# Patient Record
(demographics unavailable — no encounter records)

---

## 2022-07-13 LAB
ANION GAP SERPL CALC-SCNC: 11 MMOL/L (ref 2–17)
APPEARANCE UR: CLEAR
APPEARANCE: CLEAR
BACTERIA, URINE: ABNORMAL
BILIRUB UR STRIP.AUTO-MCNC: NEGATIVE MG/DL
BILIRUBIN, URINE, POC: NEGATIVE
BLOOD URINE, POC: ABNORMAL
BUN SERPL-MCNC: 24 MG/DL (ref 8–23)
CALCIUM SERPL-MCNC: 9.4 MG/DL (ref 8.8–10.2)
CHLORIDE SERPL-SCNC: 102 MMOL/L (ref 98–107)
COLOR UR: ABNORMAL
CREAT SERPL-MCNC: 0.9 MG/DL (ref 0.5–1)
DEPRECATED HCO3 PLAS-SCNC: 24 MMOL/L (ref 22–29)
ERYTHROCYTE [DISTWIDTH] IN BLOOD BY AUTOMATED COUNT: 17.3 % (ref 11–16)
GFR SERPL CREATININE-BSD FRML MDRD: 59 ML/MIN/1.73M²
GLUCOSE SERPL-MCNC: 111 MG/DL (ref 70–99)
GLUCOSE UR STRIP.AUTO-MCNC: NEGATIVE MG/DL
GLUCOSE URINE, POC: NEGATIVE MG/DL
HCT VFR BLD AUTO: 33.9 % (ref 34–47)
HGB BLD-MCNC: 10.6 G/DL (ref 11.5–15.7)
KETONES UR STRIP.AUTO-MCNC: NEGATIVE MG/DL
KETONES, URINE, POC: NEGATIVE MG/DL
LEUKOCYTE EST, POC: NEGATIVE
LEUKOCYTE ESTERASE UR QL STRIP: ABNORMAL
MCH RBC QN AUTO: 25.7 PG (ref 27–34.5)
MCHC RBC AUTO-ENTMCNC: 31.3 G/DL (ref 32–36)
MCV RBC AUTO: 82.3 FL (ref 81–99)
MORPHOLOGY BLD-IMP: NORMAL
MUCUS, URINE: ABNORMAL /LPF
NITRATE, URINE POC: POSITIVE
NITRITE UR QL STRIP.AUTO: POSITIVE
OSMOLALITY SERPL CALC.SUM OF ELEC: 278 MOSM/KG (ref 270–287)
PH UR STRIP.AUTO: 6 [PH] (ref 4.5–8)
PH, URINE, POC: 5.5 (ref 4.5–8)
PLATELET # BLD AUTO: 199 X10E3/MCL (ref 140–440)
PLATELET BLD QL SMEAR: ADEQUATE
PMV BLD AUTO: 10 FL (ref 7.2–13.2)
POTASSIUM SERPL-SCNC: 4.4 MMOL/L (ref 3.5–5.3)
PROT UR QL STRIP: NEGATIVE
PROTEIN,URINE, POC: NEGATIVE
RBC # BLD AUTO: 4.12 X10E6/MCL (ref 3.6–5.2)
RBC # UR STRIP: NEGATIVE /UL
RBC URINE: ABNORMAL /HPF (ref 0–2)
SODIUM SERPL-SCNC: 137 MMOL/L (ref 135–145)
SP GR UR STRIP: 1.02 (ref 1–1.03)
SPECIFIC GRAVITY, URINE, POC: 1.02 (ref 1–1.03)
SQUAMOUS EPITHELIAL: ABNORMAL /LPF
URINALYSIS COLOR, POC: YELLOW
UROBILIN U POC: 0.2 EU/DL
UROBILINOGEN UR STRIP-MCNC: 0.2 EU/DL
WBC # BLD AUTO: 5.9 X10E3/MCL (ref 3.8–10.6)
WBC URINE: ABNORMAL /HPF (ref 0–2)

## 2022-07-14 LAB
FINAL REPORT: NORMAL
ORGANISM: NORMAL
PRELIMINARY: NORMAL

## 2022-10-19 NOTE — ED NOTES
ED Pre-Arrival Note        Pre-Arrival Summary    Name:  NILS/MARICRUZ 8,    Current Date:  7/13/2022 11:07:47 EDT  Gender:    Date of Birth:    Age:  80  Pre-Arrival Type:  EMS  ETA:  7/13/2022 11:19:00 EDT  Primary Care Physician:    Presenting Problem:  malaise  Pre-Arrival User:  Salomón Rodriguez, RN, Talia Frederick  Referring Source:    Location:  [facility]  BP:  120/52  HR:  50  O2:  80            PreArrival Communication Form  Emergency Department        Additional Patient Information: feels tired after work        Orders:  [    ] CBC                                            [     ] CT Head no contrast  [    ] BMP                                           [     ] CT Abdomen/Pelvis no contrast  [    ] PT/INR                                       [     ] CT Abdomen/Pelvis IV contrast, w/ oral contrast  [    ] Troponin                                   [     ] CT Abdomen/Pelvis IV contrast, no oral contrast  [    ] BNP                                            [     ] See ordersheet  [    ] CXR                                             [     ] Other:__________________________  [    ] EKG

## 2022-10-19 NOTE — ED NOTES
amounts of urine frequently. Pain or burning with urination. Blood in the urine. Urine that smells bad or unusual.     Trouble urinating. Cloudy urine. Vaginal discharge, if you are female. Pain in the abdomen or the lower back. You may also have:   Vomiting or a decreased appetite. Confusion. Irritability or tiredness. A fever. Diarrhea. The first symptom in older adults may be confusion. In some cases, they may not have any symptoms until the infection has worsened. How is this diagnosed? This condition is diagnosed based on your medical history and a physical exam. You may also have other tests, including:   Urine tests. Blood tests. Tests for sexually transmitted infections (STIs). If you have had more than one UTI, a cystoscopy or imaging studies may be done to determine the cause of the infections. How is this treated? Treatment for this condition includes:   Antibiotic medicine. Over-the-counter medicines to treat discomfort. Drinking enough water to stay hydrated. If you have frequent infections or have other conditions such as a kidney stone, you may need to see a health care provider who specializes in the urinary tract (urologist). In rare cases, urinary tract infections can cause sepsis. Sepsis is a life-threatening condition that occurs when the body responds to an infection. Sepsis is treated in the hospital with IV antibiotics, fluids, and other medicines. Follow these instructions at home:      Medicines     Take over-the-counter and prescription medicines only as told by your health care provider. If you were prescribed an antibiotic medicine, take it as told by your health care provider. Do not stop using the antibiotic even if you start to feel better. General instructions     Make sure you:  ? Empty your bladder often and completely. Do not hold urine for long periods of time. ?  Empty your bladder after sex. ? Wipe from front to back after a bowel movement if you are female. Use each tissue one time when you wipe. Drink enough fluid to keep your urine pale yellow. Keep all follow-up visits as told by your health care provider. This is important. Contact a health care provider if:     Your symptoms do not get better after 1?2 days. Your symptoms go away and then return. Get help right away if you have:     Severe pain in your back or your lower abdomen. A fever. Nausea or vomiting. Summary     A urinary tract infection (UTI) is an infection of any part of the urinary tract, which includes the kidneys, ureters, bladder, and urethra. Most urinary tract infections are caused by bacteria in your genital area, around the entrance to your urinary tract (urethra). Treatment for this condition often includes antibiotic medicines. If you were prescribed an antibiotic medicine, take it as told by your health care provider. Do not stop using the antibiotic even if you start to feel better. Keep all follow-up visits as told by your health care provider. This is important. This information is not intended to replace advice given to you by your health care provider. Make sure you discuss any questions you have with your health care provider. Document Revised: 12/05/2019 Document Reviewed: 06/27/2019  Elsevier Patient Education ?  [address] [place].

## 2022-10-19 NOTE — ED NOTES
ED Triage Note       ED Triage Adult Entered On:  7/13/2022 11:20 EDT    Performed On:  7/13/2022 11:12 EDT by Poncho Alexander RN, Felicia               Triage   Numeric Rating Pain Scale :   0 = No pain   Chief Complaint :   Presents per EMS from SNF. Pt has no c/o's, per Nsg staff \"her blood pressure was trending down\". Pt is A+Ox3, resps easy, skin W+D. Afib per monitor, on Xarelto. Denies feeling lightheaded or dizzy.  HR 46-52 bpm.    [facility] Mode of Arrival :   Ambulance   Infectious Disease Documentation :   Document assessment   Patient received chemo or biotherapy last 48 hrs? :   No   Temperature Oral :   36.5 degC(Converted to: 97.7 degF)    Heart Rate Monitored :   46 bpm (<LLOW)    Respiratory Rate :   16 br/min   Systolic Blood Pressure :   141 mmHg (HI)    Diastolic Blood Pressure :   59 mmHg (LOW)    SpO2 :   98 %   Oxygen Therapy :   Room air   Patient presentation :   None of the above   Chief Complaint or Presentation suggest infection :   No   Weight Dosing :   68.4 kg(Converted to: 150 lb 13 oz)    Height :   151 cm(Converted to: 4 ft 11 in)    Body Mass Index Dosing :   30 kg/m2   Erin Pascal - 7/13/2022 11:12 EDT   DCP GENERIC CODE   Tracking Acuity :   2   Tracking Group :   ED [facility] [address], [place] - 7/13/2022 11:12 EDT   ED General Section :   Document assessment   Pregnancy Status :   N/A   ED Allergies Section :   Document assessment   ED Reason for Visit Section :   Document assessment   Erin Pascal - 7/13/2022 11:12 EDT   PTA/Triage Treatments   ED PTA Pre-Arrival Service :   [facility]   ED PTA Medic Number :   [address], [place] - 7/13/2022 11:12 EDT   ID Risk Screen Symptoms   Recent Travel History :   No recent travel   Last 90 days COVID-19 ID :   No   Close Contact with COVID-19 ID :   No   Last 14 days COVID-19 ID :   No   Max Rom Alberto Michelle - 7/13/2022 11:12 EDT   Allergies   (As Of: 7/13/2022 11:20:23 EDT)   Allergies (Active) Yes normal...

## 2022-10-19 NOTE — ED NOTES
ED Triage Note       ED Secondary Triage Entered On:  7/13/2022 11:50 EDT    Performed On:  7/13/2022 11:46 EDT by Sabrina Mcadams RN, Vika Vasquez Information   Barriers to Learning :   None evident   ED Home Meds Section :   Document assessment   [facility] ED Fall Risk Section :   Document assessment   ED History Section :   Document assessment   ED Advance Directives Section :   Document assessment   ED Palliative Screen :   Document assessment   Erin Keyramírez - 7/13/2022 11:46 EDT   (As Of: 7/13/2022 11:50:07 EDT)   Problems(Active)    Atrial fibrillation (SNOMED CT  :96542172 )  Name of Problem:   Atrial fibrillation ; Recorder:   Daisy Brady; Confirmation:   Confirmed ; Classification:   Patient Stated ; Code:   [id number] ; Contributor System:   Ge[employer] ; Last Updated:   7/13/2022 11:20 EDT ; Life Cycle Date:   7/13/2022 ; Life Cycle Status:   Active ; Vocabulary:   SNOMED CT        Hypertension (SNOMED CT  :9937861738 )  Name of Problem:   Hypertension ; Recorder:   Daisy Brady; Confirmation:   Confirmed ; Classification:   Patient Stated ; Code:   [id number] ; Contributor System:   PowerChart ; Last Updated:   7/13/2022 11:20 EDT ; Life Cycle Date:   7/13/2022 ; Life Cycle Status:   Active ; Vocabulary:   SNOMED CT          Diagnoses(Active)    Medical screening exam  Date:   7/13/2022 ; Diagnosis Type:   Reason For Visit ; Confirmation:   Complaint of ; Clinical Dx:    Medical screening exam ; Classification:   Medical ; Clinical Service:   Non-Specified ; Code:   PNED ; Probability:   0 ; Diagnosis Code:   [id number]             -    Procedure History   (As Of: 7/13/2022 11:50:07 EDT)     [facility] Fall Risk Assessment Tool   Hx of falling last 3 months ED Fall :   No   Erintiffanie Brady - 7/13/2022 11:46 EDT   ED Advance Directive   Advance Directive :   No   Kimberly Becker RN, Daisy Hines - 7/13/2022 11:46 EDT   Palliative Care   Does the Patient have a Life Limiting Illness :   None of the above   Yanilie Plaster - 7/13/2022 11:46 EDT   Social History   Social History   (As Of: 7/13/2022 11:50:07 EDT)   Tobacco:        Tobacco use: Never (less than 100 in lifetime). (Last Updated: 9/22/2021 13:30:03 EDT by Eric Allen RN, Attila Deluna)          Electronic Cigarette/Vaping:        Never Electronic Cigarette Use. (Last Updated: 9/22/2021 13:30:07 EDT by Eric Allen RN, Attila Deluna)          Alcohol:        Denies   (Last Updated: 9/22/2021 13:30:11 EDT by Eric Allen RN, Attila Deluna)          Substance Use:        Denies   (Last Updated: 9/22/2021 13:30:14 EDT by Eric Allen RN, Attila Deluna)            Med Hx   Medication List   (As Of: 7/13/2022 11:50:07 EDT)   Normal Order    Sodium Chloride 0.9% intravenous solution Bolus  :   Sodium Chloride 0.9% intravenous solution Bolus ; Status:   Ordered ; Ordered As Mnemonic:   Sodium Chloride 0.9% bolus ; Simple Display Line:   500 mL, 2000 mL/hr, IV Piggyback, Once ; Ordering Provider:   Hilton MAY; Catalog Code:   Sodium Chloride 0.9% ; Order Dt/Tm:   7/13/2022 11:22:45 EDT            Home Meds    amLODIPine  :   amLODIPine ; Status:   Documented ; Ordered As Mnemonic:   amLODIPine 5 mg oral tablet ; Simple Display Line:   5 mg, 1 tabs, Oral, Daily, 0 Refill(s) ; Catalog Code:   amLODIPine ; Order Dt/Tm:   7/13/2022 11:49:13 EDT          esomeprazole  :   esomeprazole ; Status:   Documented ; Ordered As Mnemonic:   esomeprazole 40 mg oral delayed release capsule ; Simple Display Line:   40 mg, 1 caps, Oral, Daily, 0 Refill(s) ; Catalog Code:   esomeprazole ; Order Dt/Tm:   7/13/2022 11:49:13 EDT          linaclotide  :   linaclotide ; Status:   Documented ; Ordered As Mnemonic:   Linzess 290 mcg oral capsule ; Simple Display Line:   0 Refill(s) ;  Catalog Code:   linaclotide ; Order Dt/Tm:   7/13/2022 11:49:13 EDT          amitriptyline  :   amitriptyline ; Status:   Documented ; Ordered As Mnemonic:   amitriptyline 25 mg oral tablet ; Simple Display Line:   See Instructions, take 35mg daily for depression, 0 Refill(s) ; Catalog Code:   amitriptyline ; Order Dt/Tm:   7/13/2022 11:49:13 EDT          conjugated estrogens  :   conjugated estrogens ; Status:   Documented ; Ordered As Mnemonic:   Premarin 0.3 mg oral tablet ; Simple Display Line:   0.3 mg, 1 tabs, Oral, Daily, 30 tabs, 0 Refill(s) ; Catalog Code:   conjugated estrogens ; Order Dt/Tm:   7/13/2022 11:49:13 EDT          hydrochlorothiazide  :   hydrochlorothiazide ; Status:   Documented ; Ordered As Mnemonic:   hydrochlorothiazide 25 mg oral tablet ; Simple Display Line:   25 mg, 1 tabs, Oral, Daily, 0 Refill(s) ; Catalog Code:   hydrochlorothiazide ; Order Dt/Tm:   7/13/2022 11:49:13 EDT          irbesartan  :   irbesartan ; Status:   Documented ; Ordered As Mnemonic:   irbesartan 300 mg oral tablet ; Simple Display Line:   300 mg, 1 tabs, Oral, Daily, 30 tabs, 0 Refill(s) ; Catalog Code:   irbesartan ; Order Dt/Tm:   7/13/2022 11:49:13 EDT          metoprolol  :   metoprolol ; Status:   Documented ; Ordered As Mnemonic:   Metoprolol Succinate ER 50 mg oral tablet, extended release ; Simple Display Line:   50 mg, 1 tabs, Oral, Daily, 30 tabs, 0 Refill(s) ; Catalog Code:   metoprolol ; Order Dt/Tm:   7/13/2022 11:49:13 EDT          potassium chloride  :   potassium chloride ; Status:   Documented ; Ordered As Mnemonic:   Potassium Chloride (Eqv-Klor-Con M10) 10 mEq oral tablet, extended release ; Simple Display Line:   0 Refill(s) ;  Catalog Code:   potassium chloride ; Order Dt/Tm:   7/13/2022 11:49:13 EDT

## 2022-10-19 NOTE — ED PROVIDER NOTES
Weakness or Fatigue *ED        Patient:   Kimberlyn Santos            MRN: [id number]            FIN: [id number]               Age:   80 years     Sex:  Female     :  1927   Associated Diagnoses:   Urinary tract infection; Bradycardia   Author:   Margarita MAY      Basic Information   Time seen: Provider Seen (ST)   ED Provider/Time:    Margarita MAY / 2022 11:12  . Additional information: Chief Complaint from Nursing Triage Note   Chief Complaint  Chief Complaint: Presents per EMS from SNF. Pt has no c/o's, per Nsg staff \"her blood pressure was trending down\". Pt is A+Ox3, resps easy, skin W+D. Afib per monitor, on Xarelto. Denies feeling lightheaded or dizzy. HR 46-52 bpm. (22 11:12:00). History of Present Illness   Presents from her skilled nursing facility for evaluation of weakness. She denies fevers, chest pain, shortness of breath, blood in stool, abdominal pain, vomiting, or fevers. She has not had any clear urinary symptoms. But her blood pressure was getting a little low but the patient notes that she took her antihypertensives this morning about 7 AM.  No specific aggravating or alleviating factors. She is on Xarelto. Severity is mild. Review of Systems             Additional review of systems information: All other systems reviewed and otherwise negative. Health Status   Allergies: Allergic Reactions (All)  No Known Allergies. Past Medical/ Family/ Social History   Medical history: Reviewed as documented in chart. Surgical history: Reviewed as documented in chart. Family history: Not significant. Social history: Reviewed as documented in chart. Problem list:    Active Problems (2)  Atrial fibrillation   Hypertension   .       Physical Examination               Vital Signs   Vital Signs   6907 73:05 EDT Systolic Blood Pressure 857 mmHg    Diastolic Blood Pressure 60 mmHg    Heart Rate Monitored 50 bpm  LOW    Respiratory Rate 16 br/min Mean Arterial Pressure, Cuff 76 mmHg    SpO2 98 %   3/60/5219 78:24 EDT Systolic Blood Pressure 926 mmHg    Diastolic Blood Pressure 59 mmHg  LOW    Heart Rate Monitored 43 bpm  <LLOW    Respiratory Rate 19 br/min    Mean Arterial Pressure, Cuff 91 mmHg    SpO2 99 %   6/64/4060 25:90 EDT Systolic Blood Pressure 985 mmHg    Diastolic Blood Pressure 59 mmHg  LOW    Peripheral Pulse Rate 45 bpm  LOW    Heart Rate Monitored 44 bpm  <LLOW    Respiratory Rate 17 br/min    Mean Arterial Pressure, Cuff 92 mmHg    SpO2 99 %   4/12/6193 43:27 EDT Systolic Blood Pressure 914 mmHg  HI    Diastolic Blood Pressure 59 mmHg  LOW    Temperature Oral 36.5 degC    Heart Rate Monitored 46 bpm  <LLOW    Respiratory Rate 16 br/min    SpO2 98 %   . Measurements   7/13/2022 11:20 EDT Body Mass Index est lb 30.00 kg/m2    Body Mass Index Measured 30.00 kg/m2   7/13/2022 11:12 EDT Height/Length Measured 151 cm    Weight Dosing 68.4 kg   . Basic Oxygen Information   7/13/2022 12:49 EDT Oxygen Therapy Room air    SpO2 98 %   7/13/2022 12:00 EDT Oxygen Therapy Room air    SpO2 99 %   7/13/2022 11:51 EDT Oxygen Therapy Room air    SpO2 99 %   7/13/2022 11:12 EDT Oxygen Therapy Room air    SpO2 98 %   . General:  Alert, no acute distress. Skin:  Warm, dry, pink. Head:  Normocephalic, atraumatic. Neck:  Supple. Eye:  Extraocular movements are intact, normal conjunctiva. Ears, nose, mouth and throat:  Slightly dry mucous membranes. Cardiovascular:  Regular rate and rhythm, No murmur, Normal peripheral perfusion. Respiratory:  Lungs are clear to auscultation, respirations are non-labored, breath sounds are equal.    Gastrointestinal:  Soft, Non distended, Normal bowel sounds, Mild suprapubic tenderness. No rebound or guarding. Musculoskeletal:  No tenderness, no deformity. Neurological:  Alert and oriented to person, place, time, and situation, No focal neurological deficit observed.     Psychiatric:  Cooperative, Very pleasant. Medical Decision Making   Documents reviewed:  Emergency department nurses' notes, flowsheet, vital signs. Electrocardiogram:  Emergency Provider interpretation performed by me, EKG by my review interpretation shows a supraventricular bradycardia and there is left axis deviation. Nonspecific T wave changes. No ST elevation. .    Results review:  Lab results : Lab View   7/13/2022 11:46 EDT Estimated Creatinine Clearance 31.82 mL/min   7/13/2022 11:26 EDT WBC 5.9 x10e3/mcL    RBC 4.12 x10e6/mcL    Hgb 10.6 g/dL  LOW    HCT 33.9 %  LOW    MCV 82.3 fL    MCH 25.7 pg  LOW    MCHC 31.3 g/dL  LOW    RDW 17.3 %  HI    Platelet 792 [id number]    MPV 10.0 fL    PLT estimate Adequate    RBC morphology Not Indicated    UA Color Straw    UA Appear Clear    UA Glucose Negative    UA Bili Negative    UA Ketones Negative    UA Spec Grav 1.020    UA Blood Negative    UA pH 6.0    Protein U Negative    UA Urobilinogen 0.2 EU/dL    UA Nitrite Positive    UA Leuk Est Small    WBC U 6-10 /HPF    RBC U 0-2 /HPF    Sq Epi U Few /LPF    Bacteria U Moderate    Mucus U Few /LPF    Sodium Lvl 137 mmol/L    Potassium Lvl 4.4 mmol/L    Chloride 102 mmol/L    CO2 24 mmol/L    Glucose Random 111 mg/dL  HI    BUN 24 mg/dL  HI    Creatinine Lvl 0.9 mg/dL    AGAP 11 mmol/L    Osmolality Calc 278 mOsm/kg    Calcium Lvl 9.4 mg/dL    eGFR 59 mL/min/1.73mÂ²  LOW   7/13/2022 11:18 EDT Appear U POC Clear    Color U POC Yellow    Bili U POC Negative    Blood U POC Trace    Glucose U POC Negative mg/dL    Ketones U POC Negative mg/dL    Leuk Est U POC Negative    Nitrite U POC Positive    pH U POC 5.5    Protein U POC Negative    Spec Grav U POC 1.025    Urobilin U POC 0.2 EU/dL   . Radiology results:  Rad Results (ST)   No qualifying data available. .      Impression and Plan   Diagnosis   Urinary tract infection (TEF76-PV N39.0, Discharge, Medical)   Bradycardia (CRF82-WP R00.1, Discharge, Medical)   Plan   Condition: Improved. Disposition: Discharged: to home. Prescriptions: Launch prescriptions   Pharmacy:  Keflex 500 mg oral capsule (Prescribe): 500 mg, 1 caps, Oral, q6hr, for 7 days, 28 caps, 0 Refill(s). Patient was given the following educational materials: Urinary Tract Infection, Adult, Urinary Tract Infection, Adult. Follow up with: Follow up with primary care provider Within 2 to 4 days For reevaluation if symptoms not improving; return to ED if any new concerns or changes in symptoms    Your heart rate was low today. Decrease the metoprolol dose in half from 50 mg to 25 mg. If the heart rate is still below 60 then stop taking the medicine. Please follow-up with your cardiologist or primary care doctor to reevaluate this. Notes: Patient presents with generalized weakness. She was bradycardic here but she has slightly hypertensive blood pressure. She had some lower abdominal tenderness with urine appears infected. We will treat her for UTI. Advised to decrease the metoprolol and follow-up with her doctor. I do not think she requires intervention regarding the heart rate as she is clearly perfusing well and has a normal blood pressure, really that is slightly hypertensive.     Signature Line     Electronically Signed on 07/13/2022 02:10 PM EDT   ________________________________________________   Meka MAY

## 2022-10-19 NOTE — ED NOTES
Dose:____________________  potassium chloride (Potassium Chloride (Eqv-Klor-Con M10) 10 mEq oral tablet, extended release)   Last Dose:____________________      Allergy Info: No Known Allergies     Discharge Additional Information          Discharge Patient 07/13/22 14:04:00 EDT      Patient Education Materials:        Urinary Tract Infection, Adult      A urinary tract infection (UTI) is an infection of any part of the urinary tract. The urinary tract includes the kidneys, ureters, bladder, and urethra. These organs make, store, and get rid of urine in the body. Your health care provider may use other names to describe the infection. An upper UTI affects the ureters and kidneys (pyelonephritis). A lower UTI affects the bladder (cystitis) and urethra (urethritis). What are the causes? Most urinary tract infections are caused by bacteria in your genital area, around the entrance to your urinary tract (urethra). These bacteria grow and cause inflammation of your urinary tract. What increases the risk? You are more likely to develop this condition if:   You have a urinary catheter that stays in place (indwelling). You are not able to control when you urinate or have a bowel movement (you have incontinence). You are female and you:  ? Use a spermicide or diaphragm for birth control. ? Have low estrogen levels. ? Are pregnant. You have certain genes that increase your risk (genetics). You are sexually active. You take antibiotic medicines. You have a condition that causes your flow of urine to slow down, such as:  ? An enlarged prostate, if you are male. ? Blockage in your urethra (stricture). ? A kidney stone. ? A nerve condition that affects your bladder control (neurogenic bladder). ? Not getting enough to drink, or not urinating often. You have certain medical conditions, such as:  ? Diabetes. ? A weak disease-fighting system (immunesystem). ? Sickle cell disease. ? Gout. ? Spinal cord injury. What are the signs or symptoms? Symptoms of this condition include:   Needing to urinate right away (urgently). Frequent urination or passing small amounts of urine frequently. Pain or burning with urination. Blood in the urine. Urine that smells bad or unusual.     Trouble urinating. Cloudy urine. Vaginal discharge, if you are female. Pain in the abdomen or the lower back. You may also have:   Vomiting or a decreased appetite. Confusion. Irritability or tiredness. A fever. Diarrhea. The first symptom in older adults may be confusion. In some cases, they may not have any symptoms until the infection has worsened. How is this diagnosed? This condition is diagnosed based on your medical history and a physical exam. You may also have other tests, including:   Urine tests. Blood tests. Tests for sexually transmitted infections (STIs). If you have had more than one UTI, a cystoscopy or imaging studies may be done to determine the cause of the infections. How is this treated? Treatment for this condition includes:   Antibiotic medicine. Over-the-counter medicines to treat discomfort. Drinking enough water to stay hydrated. If you have frequent infections or have other conditions such as a kidney stone, you may need to see a health care provider who specializes in the urinary tract (urologist). In rare cases, urinary tract infections can cause sepsis. Sepsis is a life-threatening condition that occurs when the body responds to an infection. Sepsis is treated in the hospital with IV antibiotics, fluids, and other medicines. Follow these instructions at home:      Medicines     Take over-the-counter and prescription medicines only as told by your health care provider.      If you were prescribed an antibiotic medicine, take it as told by your health care provider. Do not stop using the antibiotic even if you start to feel better. General instructions     Make sure you:  ? Empty your bladder often and completely. Do not hold urine for long periods of time. ? Empty your bladder after sex. ? Wipe from front to back after a bowel movement if you are female. Use each tissue one time when you wipe. Drink enough fluid to keep your urine pale yellow. Keep all follow-up visits as told by your health care provider. This is important. Contact a health care provider if:     Your symptoms do not get better after 1?2 days. Your symptoms go away and then return. Get help right away if you have:     Severe pain in your back or your lower abdomen. A fever. Nausea or vomiting. Summary     A urinary tract infection (UTI) is an infection of any part of the urinary tract, which includes the kidneys, ureters, bladder, and urethra. Most urinary tract infections are caused by bacteria in your genital area, around the entrance to your urinary tract (urethra). Treatment for this condition often includes antibiotic medicines. If you were prescribed an antibiotic medicine, take it as told by your health care provider. Do not stop using the antibiotic even if you start to feel better. Keep all follow-up visits as told by your health care provider. This is important. This information is not intended to replace advice given to you by your health care provider. Make sure you discuss any questions you have with your health care provider. Document Revised: 12/05/2019 Document Reviewed: 06/27/2019  [employer]it Patient Education ?  [address] [address]      ---------------------------------------------------------------------------------------------------------------------  [facility] allows patients to review your COVID and other test results as well as discharge documents from any [facility], Emergency Department, surgical center or outpatient lab. Test results are typically available 36 hours after the test is completed. [address] encourages you to self-enroll in the [facility] Patient Portal.     To begin your self-enrollment process, please visit [email or web address] Under [facility], click on Sign up now. NOTE: You must be 16 years and older to use [facility] Self-Enroll online. If you are a parent, caregiver, or guardian; you need an invite to access your childs or dependents health records. To obtain an invite, contact the Medical Records department at [phone] Monday through Friday, 8-4:30, select option 3 . If we receive your call afterhours, we will return your call the next business day. If you have issues trying to create or access your account, contact [employer] at [phone] available 7 days a week 24 hours a day.      Comment:

## 2022-10-19 NOTE — DISCHARGE SUMMARY
ED Clinical Summary                         [facility]  [address], [place], [id number] [phone]           PERSON INFORMATION  Name: Gely Arreguin Age:  80 Years : 1927   Sex: Female Language: English PCP: Kwaku MEIER   Marital Status:   Phone: [phone] Med Service: MED-Medicine   MRN:  [id number] Acct# [de-identified] Arrival: 2022 11:05:00   Visit Reason: Medical screening exam; EMS / MALAISE Acuity: 2 LOS: 000 04:57   Address:      [address] [place] [address]  Diagnosis:      Bradycardia; Urinary tract infection  Printed Prescriptions: Allergies      No Known Allergies      Medications Administered During Visit:                  Medication Dose Route   Sodium Chloride 0.9% 500 mL IV Piggyback   ceftriaxone 1 g IV Piggyback       Patient Medication List:              amitriptyline (amitriptyline 25 mg oral tablet) take 35mg daily for depression. amLODIPine (amLODIPine 5 mg oral tablet) 1 Tabs Oral (given by mouth) every day. cephalexin (Keflex 500 mg oral capsule) 1 Capsules Oral (given by mouth) every 6 hours for 7 Days. Refills: 0.  conjugated estrogens (Premarin 0.3 mg oral tablet) 1 Tabs Oral (given by mouth) every day.  esomeprazole (esomeprazole 40 mg oral delayed release capsule) 1 Capsules Oral (given by mouth) every day.  hydrochlorothiazide (hydrochlorothiazide 25 mg oral tablet) 1 Tabs Oral (given by mouth) every day. irbesartan (irbesartan 300 mg oral tablet) 1 Tabs Oral (given by mouth) every day. linaclotide (Linzess 290 mcg oral capsule)  metoprolol (Metoprolol Succinate ER 50 mg oral tablet, extended release) 1 Tabs Oral (given by mouth) every day. potassium chloride (Potassium Chloride (Eqv-Klor-Con M10) 10 mEq oral tablet, extended release)         Major Tests and Procedures: The following procedures and tests were performed during your ED visit.   COMMONPROCEDURES%>  COMMON PROCEDURESCOMMENTS%>          Laboratory Orders  Name Status Details   . UA Micro Completed Urine, Clean Catch, Stat, ST - Stat, Collected, 07/13/22 11:26:00 EDT, 07/13/22 11:26:00 EDT, Nurse collect, 07/13/22 11:26:00 EDT, Yovani MAY, Print label Y/N, [id number]   . UA POC Completed Urine, RT, RT - Routine, Collected, 07/13/22 11:18:00 EDT, Nurse collect, 07/13/22 11:18:00 /[facility], [facility] POC Login   BMP Completed Blood, Stat, ST - Stat, 07/13/22 11:22:00 EDT, 07/13/22 11:23:00 EDT, Nurse rubio, Yovani MAY, Print label Y/N   C Urine Ordered Urine, Clean Catch, Stat, ST - Stat, 07/13/22 11:23:00 EDT, 07/13/22 11:23:00 EDT, Nurse collect   CBC Completed Blood, Stat, ST - Stat, 07/13/22 11:22:00 EDT, 07/13/22 11:23:00 EDT, Nurse ramiro, Yovani MAY, Print label Y/N   Morphology Review Completed Blood, Stat, ST - Stat, 07/13/22 11:22:00 EDT, 07/13/22 11:22:00 EDT, Nurse collect, 07/13/22 11:26:00 [facility] [id number]   UA Rflx Waldo Completed Urine, Clean Catch, Stat, ST - Stat, 07/13/22 11:23:00 EDT, 07/13/22 11:23:00 EDT, Nurse rubio, Yovani MAY, Print label Y/N               Radiology Orders  No radiology orders were placed.               Patient Care Orders  Name Status Details   Discharge Patient Ordered 07/13/22 14:04:00 EDT   ED Assessment Adult Completed 07/13/22 11:20:24 EDT, 07/13/22 11:20:24 EDT   ED Secondary Triage Completed 07/13/22 11:20:24 EDT, 07/13/22 11:20:24 EDT   ED Triage Adult Completed 07/13/22 11:05:07 EDT, 07/13/22 11:05:07 EDT   Saline Lock Insert Completed 07/13/22 11:22:00 EDT, Once, 07/13/22 11:22:00 EDT             PROVIDER INFORMATION               Provider Role Assigned Joe MAY ED Provider 7/13/2022 11:12:08    Suma Le ED Nurse 7/13/2022 11:56:19        Attending Physician:  Yovani MAY     Admit Doc  Yovani MAY     Consulting Doc       VITALS INFORMATION  Vital Sign Triage Latest   Temp Oral ORAL_1%>36.5 degC ORAL%>36.8 degC   Temp Temporal TEMPORAL_1%> TEMPORAL%>   Temp Intravascular INTRAVASCULAR_1%> INTRAVASCULAR%>   Temp Axillary AXILLARY_1%> AXILLARY%>   Temp Rectal RECTAL_1%> RECTAL%>   02 Sat 98 % 98 %   Respiratory Rate RATE_1%>16 br/min RATE%>22 br/min   Peripheral Pulse Rate PULSE RATE_1%>45 bpm PULSE RATE%>45 bpm   Apical Heart Rate HEART RATE_1%> HEART RATE%>   Blood Pressure BLOOD PRESSURE_1%>/ BLOOD PRESSURE_1%>59 mmHg BLOOD PRESSURE%>124 mmHg / BLOOD PRESSURE%>58 mmHg                 Immunizations      No Immunizations Documented This Visit          DISCHARGE INFORMATION   Discharge Disposition: H Outpt-Sent Home   Discharge Location:    Home   Discharge Date and Time:    2022 16:02:00   ED Checkout Date and Time:    2022 16:02:00     DEPART REASON INCOMPLETE INFORMATION               Depart Action Incomplete Reason   Interactive View/I&O Recently assessed               Problems      No Problems Documented              Smoking Status      Never (less than 100 in lifetime)         PATIENT EDUCATION INFORMATION  Instructions:       Urinary Tract Infection, Adult     Follow up:                    With: Address: When:   Follow up with primary care provider  Within 2 to 4 days   Comments: For reevaluation if symptoms not improving; return to ED if any new concerns or changes in symptoms     Your heart rate was low today. Decrease the metoprolol dose in half from 50 mg to 25 mg. If the heart rate is still below 60 then stop taking the medicine.      Please follow-up with your cardiologist or primary care doctor to reevaluate this           ED PROVIDER DOCUMENTATION     Patient:   Markus Marin            MRN: [id number]            FIN: [id number]               Age:   95 years     Sex:  Female     :  1927   Associated Diagnoses:   Urinary tract infection; Bradycardia   Author:   Sharren Cogan A-MD      Basic Information   Time seen: Provider Seen (ST)   ED Provider/Time:    Nidhi MAY / 07/13/2022 11:12  . Additional information: Chief Complaint from Nursing Triage Note   Chief Complaint  Chief Complaint: Presents per EMS from SNF. Pt has no c/o's, per Nsg staff \"her blood pressure was trending down\". Pt is A+Ox3, resps easy, skin W+D. Afib per monitor, on Xarelto. Denies feeling lightheaded or dizzy. HR 46-52 bpm. (07/13/22 11:12:00). History of Present Illness   Presents from her skilled nursing facility for evaluation of weakness. She denies fevers, chest pain, shortness of breath, blood in stool, abdominal pain, vomiting, or fevers. She has not had any clear urinary symptoms. But her blood pressure was getting a little low but the patient notes that she took her antihypertensives this morning about 7 AM.  No specific aggravating or alleviating factors. She is on Xarelto. Severity is mild. Review of Systems             Additional review of systems information: All other systems reviewed and otherwise negative. Health Status   Allergies: Allergic Reactions (All)  No Known Allergies. Past Medical/ Family/ Social History   Medical history: Reviewed as documented in chart. Surgical history: Reviewed as documented in chart. Family history: Not significant. Social history: Reviewed as documented in chart. Problem list:    Active Problems (2)  Atrial fibrillation   Hypertension   .       Physical Examination               Vital Signs   Vital Signs   6/18/5863 88:07 EDT Systolic Blood Pressure 701 mmHg    Diastolic Blood Pressure 60 mmHg    Heart Rate Monitored 50 bpm  LOW    Respiratory Rate 16 br/min    Mean Arterial Pressure, Cuff 76 mmHg    SpO2 98 %   0/07/5423 77:60 EDT Systolic Blood Pressure 998 mmHg    Diastolic Blood Pressure 59 mmHg  LOW    Heart Rate Monitored 43 bpm  <LLOW    Respiratory Rate 19 br/min    Mean Arterial Pressure, Cuff 91 mmHg    SpO2 99 %   0/42/4831 29:71 EDT Systolic Blood Pressure 247 mmHg Diastolic Blood Pressure 59 mmHg  LOW    Peripheral Pulse Rate 45 bpm  LOW    Heart Rate Monitored 44 bpm  <LLOW    Respiratory Rate 17 br/min    Mean Arterial Pressure, Cuff 92 mmHg    SpO2 99 %   8/45/2459 87:36 EDT Systolic Blood Pressure 597 mmHg  HI    Diastolic Blood Pressure 59 mmHg  LOW    Temperature Oral 36.5 degC    Heart Rate Monitored 46 bpm  <LLOW    Respiratory Rate 16 br/min    SpO2 98 %   . Measurements   7/13/2022 11:20 EDT Body Mass Index est lb 30.00 kg/m2    Body Mass Index Measured 30.00 kg/m2   7/13/2022 11:12 EDT Height/Length Measured 151 cm    Weight Dosing 68.4 kg   . Basic Oxygen Information   7/13/2022 12:49 EDT Oxygen Therapy Room air    SpO2 98 %   7/13/2022 12:00 EDT Oxygen Therapy Room air    SpO2 99 %   7/13/2022 11:51 EDT Oxygen Therapy Room air    SpO2 99 %   7/13/2022 11:12 EDT Oxygen Therapy Room air    SpO2 98 %   . General:  Alert, no acute distress. Skin:  Warm, dry, pink. Head:  Normocephalic, atraumatic. Neck:  Supple. Eye:  Extraocular movements are intact, normal conjunctiva. Ears, nose, mouth and throat:  Slightly dry mucous membranes. Cardiovascular:  Regular rate and rhythm, No murmur, Normal peripheral perfusion. Respiratory:  Lungs are clear to auscultation, respirations are non-labored, breath sounds are equal.    Gastrointestinal:  Soft, Non distended, Normal bowel sounds, Mild suprapubic tenderness. No rebound or guarding. Musculoskeletal:  No tenderness, no deformity. Neurological:  Alert and oriented to person, place, time, and situation, No focal neurological deficit observed. Psychiatric:  Cooperative, Very pleasant. Medical Decision Making   Documents reviewed:  Emergency department nurses' notes, flowsheet, vital signs. Electrocardiogram:  Emergency Provider interpretation performed by me, EKG by my review interpretation shows a supraventricular bradycardia and there is left axis deviation.   Nonspecific T wave changes. No ST elevation. .    Results review:  Lab results : Lab View   7/13/2022 11:46 EDT Estimated Creatinine Clearance 31.82 mL/min   7/13/2022 11:26 EDT WBC 5.9 x10e3/mcL    RBC 4.12 x10e6/mcL    Hgb 10.6 g/dL  LOW    HCT 33.9 %  LOW    MCV 82.3 fL    MCH 25.7 pg  LOW    MCHC 31.3 g/dL  LOW    RDW 17.3 %  HI    Platelet 110 W03B9/TBD    MPV 10.0 fL    PLT estimate Adequate    RBC morphology Not Indicated    UA Color Straw    UA Appear Clear    UA Glucose Negative    UA Bili Negative    UA Ketones Negative    UA Spec Grav 1.020    UA Blood Negative    UA pH 6.0    Protein U Negative    UA Urobilinogen 0.2 EU/dL    UA Nitrite Positive    UA Leuk Est Small    WBC U 6-10 /HPF    RBC U 0-2 /HPF    Sq Epi U Few /LPF    Bacteria U Moderate    Mucus U Few /LPF    Sodium Lvl 137 mmol/L    Potassium Lvl 4.4 mmol/L    Chloride 102 mmol/L    CO2 24 mmol/L    Glucose Random 111 mg/dL  HI    BUN 24 mg/dL  HI    Creatinine Lvl 0.9 mg/dL    AGAP 11 mmol/L    Osmolality Calc 278 mOsm/kg    Calcium Lvl 9.4 mg/dL    eGFR 59 mL/min/1.73mÂ²  LOW   7/13/2022 11:18 EDT Appear U POC Clear    Color U POC Yellow    Bili U POC Negative    Blood U POC Trace    Glucose U POC Negative mg/dL    Ketones U POC Negative mg/dL    Leuk Est U POC Negative    Nitrite U POC Positive    pH U POC 5.5    Protein U POC Negative    Spec Grav U POC 1.025    Urobilin U POC 0.2 EU/dL   . Radiology results:  Rad Results (ST)   No qualifying data available. .      Impression and Plan   Diagnosis   Urinary tract infection (OQL87-PG N39.0, Discharge, Medical)   Bradycardia (OCU33-AN R00.1, Discharge, Medical)   Plan   Condition: Improved. Disposition: Discharged: to home. Prescriptions: Launch prescriptions   Pharmacy:  Keflex 500 mg oral capsule (Prescribe): 500 mg, 1 caps, Oral, q6hr, for 7 days, 28 caps, 0 Refill(s). Patient was given the following educational materials: Urinary Tract Infection, Adult, Urinary Tract Infection, Adult.     Follow up with: Follow up with primary care provider Within 2 to 4 days For reevaluation if symptoms not improving; return to ED if any new concerns or changes in symptoms    Your heart rate was low today. Decrease the metoprolol dose in half from 50 mg to 25 mg. If the heart rate is still below 60 then stop taking the medicine. Please follow-up with your cardiologist or primary care doctor to reevaluate this. Notes: Patient presents with generalized weakness. She was bradycardic here but she has slightly hypertensive blood pressure. She had some lower abdominal tenderness with urine appears infected. We will treat her for UTI. Advised to decrease the metoprolol and follow-up with her doctor. I do not think she requires intervention regarding the heart rate as she is clearly perfusing well and has a normal blood pressure, really that is slightly hypertensive.

## 2022-11-05 NOTE — ED NOTES
ED Results Callback Log     Urine Culture  Collected: 07/13/2022 EC  Complete Body site:   Specimen Type: U CleanCatch 07/15/2022 08:33      07/15/2022 09:18 Prasad Vega RN, Nanette Higgins) No further action required Urine culture came back positive, sensitive to cefazolin. Pt prescribed keflex.